# Patient Record
Sex: MALE | Race: OTHER | Employment: FULL TIME | URBAN - NONMETROPOLITAN AREA
[De-identification: names, ages, dates, MRNs, and addresses within clinical notes are randomized per-mention and may not be internally consistent; named-entity substitution may affect disease eponyms.]

---

## 2021-11-18 ENCOUNTER — HOSPITAL ENCOUNTER (EMERGENCY)
Age: 21
Discharge: HOME | End: 2021-11-18
Attending: EMERGENCY MEDICINE
Payer: COMMERCIAL

## 2021-11-18 VITALS
HEART RATE: 88 BPM | HEIGHT: 68 IN | SYSTOLIC BLOOD PRESSURE: 187 MMHG | BODY MASS INDEX: 24.25 KG/M2 | DIASTOLIC BLOOD PRESSURE: 85 MMHG | WEIGHT: 160 LBS | RESPIRATION RATE: 18 BRPM | OXYGEN SATURATION: 100 % | TEMPERATURE: 98.8 F

## 2021-11-18 DIAGNOSIS — S05.51XA INTRAOCULAR FOREIGN BODY OF RIGHT EYE, INITIAL ENCOUNTER: Primary | ICD-10-CM

## 2021-11-18 PROCEDURE — 99284 EMERGENCY DEPT VISIT MOD MDM: CPT

## 2021-11-18 PROCEDURE — 6370000000 HC RX 637 (ALT 250 FOR IP): Performed by: EMERGENCY MEDICINE

## 2021-11-18 PROCEDURE — 65222 REMOVE FOREIGN BODY FROM EYE: CPT

## 2021-11-18 RX ORDER — TETRACAINE HYDROCHLORIDE 5 MG/ML
1 SOLUTION OPHTHALMIC ONCE
Status: COMPLETED | OUTPATIENT
Start: 2021-11-18 | End: 2021-11-18

## 2021-11-18 RX ORDER — ERYTHROMYCIN 5 MG/G
OINTMENT OPHTHALMIC ONCE
Status: COMPLETED | OUTPATIENT
Start: 2021-11-18 | End: 2021-11-18

## 2021-11-18 RX ADMIN — ERYTHROMYCIN: 5 OINTMENT OPHTHALMIC at 20:32

## 2021-11-18 RX ADMIN — TETRACAINE HYDROCHLORIDE 1 DROP: 5 SOLUTION OPHTHALMIC at 20:32

## 2021-11-18 RX ADMIN — FLUORESCEIN SODIUM 1 EACH: 0.6 STRIP OPHTHALMIC at 20:32

## 2021-11-18 ASSESSMENT — PAIN DESCRIPTION - LOCATION: LOCATION: EYE

## 2021-11-18 ASSESSMENT — ENCOUNTER SYMPTOMS
PHOTOPHOBIA: 0
EYE REDNESS: 1
EYE PAIN: 1
EYE DISCHARGE: 1

## 2021-11-18 ASSESSMENT — PAIN DESCRIPTION - ORIENTATION: ORIENTATION: RIGHT

## 2021-11-18 ASSESSMENT — PAIN DESCRIPTION - ONSET: ONSET: ON-GOING

## 2021-11-18 ASSESSMENT — PAIN DESCRIPTION - FREQUENCY: FREQUENCY: CONTINUOUS

## 2021-11-18 ASSESSMENT — PAIN SCALES - GENERAL: PAINLEVEL_OUTOF10: 6

## 2021-11-18 ASSESSMENT — PAIN DESCRIPTION - PAIN TYPE: TYPE: ACUTE PAIN

## 2021-11-18 ASSESSMENT — PAIN DESCRIPTION - DESCRIPTORS: DESCRIPTORS: SHARP

## 2021-11-19 NOTE — ED NOTES
Discharge instructions and medications reviewed with patient. Patient verbalized understanding of medications and follow up. All questions answered at this time. Skin warm, pink, and dry. Patient alert and oriented x4. Pt ambulated to lobby with a stable gait. Patient discharged home with 0 prescriptions.       Noelle Romberg, RN  11/18/21 2032

## 2021-11-19 NOTE — ED PROVIDER NOTES
CHIEF COMPLAINT  Foreign Body in Eye (right eye, pt reports drilling into metal and piece of metal went into right eye, \"couple hours ago\")      HISTORY OF PRESENT ILLNESS  Quin Chin is a 23 y.o. male with out significant history presents emergency department for evaluation of right eye foreign body. Patient speaks Pakistani and encounter is performed with the use of a Pakistani . Patient is accompanied by his boss from his worksite. According to the patient, several hours ago he was drilling into metal when a piece of metal went into his right eye. He states that the pain has gone away. He states that he was able to see this small piece of metal in his eye. He denies any visual changes at this time. No other complaints, modifying factors or associated symptoms. No past medical history on file. No past surgical history on file. No family history on file. Social History     Socioeconomic History    Marital status: Single     Spouse name: Not on file    Number of children: Not on file    Years of education: Not on file    Highest education level: Not on file   Occupational History    Not on file   Tobacco Use    Smoking status: Not on file    Smokeless tobacco: Not on file   Substance and Sexual Activity    Alcohol use: Not on file    Drug use: Not Currently    Sexual activity: Not on file   Other Topics Concern    Not on file   Social History Narrative    Not on file     Social Determinants of Health     Financial Resource Strain:     Difficulty of Paying Living Expenses: Not on file   Food Insecurity:     Worried About Running Out of Food in the Last Year: Not on file    Jadyn of Food in the Last Year: Not on file   Transportation Needs:     Lack of Transportation (Medical): Not on file    Lack of Transportation (Non-Medical):  Not on file   Physical Activity:     Days of Exercise per Week: Not on file    Minutes of Exercise per Session: Not on file   Stress:     Feeling of Stress : Not on file   Social Connections:     Frequency of Communication with Friends and Family: Not on file    Frequency of Social Gatherings with Friends and Family: Not on file    Attends Jehovah's witness Services: Not on file    Active Member of Clubs or Organizations: Not on file    Attends Club or Organization Meetings: Not on file    Marital Status: Not on file   Intimate Partner Violence:     Fear of Current or Ex-Partner: Not on file    Emotionally Abused: Not on file    Physically Abused: Not on file    Sexually Abused: Not on file   Housing Stability:     Unable to Pay for Housing in the Last Year: Not on file    Number of [place] in the Last Year: Not on file    Unstable Housing in the Last Year: Not on file     No current facility-administered medications for this encounter. No current outpatient medications on file. No Known Allergies    REVIEW OF SYSTEMS  Review of Systems   Eyes: Positive for pain, discharge and redness. Negative for photophobia and visual disturbance. Skin: Negative for rash. PHYSICAL EXAM  BP (!) 187/85   Pulse 88   Temp 98.8 °F (37.1 °C) (Oral)   Resp 18   Ht 5' 8\" (1.727 m)   Wt 160 lb (72.6 kg)   SpO2 100%   BMI 24.33 kg/m²   GENERAL APPEARANCE: Awake and alert. Cooperative. HEAD: Normocephalic. Atraumatic. EYES: PERRL. EOM's grossly intact. There is a punctate metallic foreign body at the 11 oclock position in the conjunctiva above the cornea. On fluorescein exam, there is no uptake consistent with corneal ulceration or abrasion  ENT: Mucous membranes are moist.   ABDOMEN: Soft. Non-distended. Non-tender. No guarding or rebound. EXTREMITIES: No peripheral edema. No acute deformities. SKIN: Warm and dry. No acute rashes. NEUROLOGICAL: Alert and oriented X 3. No focal neurological deficits  PSYCHIATRIC: Normal mood and affect. LABS  I have reviewed all labs for this visit.    No results found for this visit on 11/18/21. ED COURSE/MDM    Patient presenting with metallic foreign body in the conjunctiva of the right eye. There is no visual changes or evidence of corneal abrasion, ulceration. With slit-lamp exam, and odilon vise, I attempted to remove the metallic foreign body. Despite topical anesthetic, patient could not tolerate the procedure. He states that he does not have any pain associated with the procedure but cannot sit still for the procedure as he is nervous regarding the sensation/procedure. As the patient is not able to sit still for the procedure, I am concerned that this could lead to undue injury. As the metallic foreign body is not in a central line of vision or the cornea, outpatient ophthalmology follow-up is appropriate. He was provided with to local ophthalmology offices to follow-up with. He will be provided with erythromycin ointment to use as directed until his follow-up. He expresses understanding with this. The patient will be discharged from the emergency department. The patient was counseled on their diagnosis and any medications prescribed. They were advised on the need for PCP followup. They were counseled on the need to return to the emergency department if any of their symptoms were to worsen, change or have any other concerns. Discharged in stable condition. CLINICAL IMPRESSION  1. Intraocular foreign body of right eye, initial encounter        Blood pressure (!) 187/85, pulse 88, temperature 98.8 °F (37.1 °C), temperature source Oral, resp. rate 18, height 5' 8\" (1.727 m), weight 160 lb (72.6 kg), SpO2 100 %. DISPOSITION  Jonatan Al was discharged to homein stable condition. This chart was generated in part by using Dragon Dictation system and may contain errors related to that system including errors in grammar, punctuation, and spelling, as well as words and phrases that may be inappropriate.  If there are any questions or concerns please feel free to contact the dictating provider for clarification.      Josias Pepe MD  11/18/21 [id number]

## 2021-11-20 ENCOUNTER — HOSPITAL ENCOUNTER (EMERGENCY)
Age: 21
Discharge: HOME | End: 2021-11-20
Attending: STUDENT IN AN ORGANIZED HEALTH CARE EDUCATION/TRAINING PROGRAM
Payer: COMMERCIAL

## 2021-11-20 VITALS
RESPIRATION RATE: 18 BRPM | BODY MASS INDEX: 24.71 KG/M2 | OXYGEN SATURATION: 98 % | WEIGHT: 163 LBS | HEART RATE: 74 BPM | DIASTOLIC BLOOD PRESSURE: 78 MMHG | SYSTOLIC BLOOD PRESSURE: 118 MMHG | TEMPERATURE: 98.3 F | HEIGHT: 68 IN

## 2021-11-20 DIAGNOSIS — T15.91XD FOREIGN BODY OF RIGHT EYE, SUBSEQUENT ENCOUNTER: Primary | ICD-10-CM

## 2021-11-20 PROCEDURE — 99284 EMERGENCY DEPT VISIT MOD MDM: CPT

## 2021-11-20 ASSESSMENT — PAIN DESCRIPTION - LOCATION: LOCATION: EYE

## 2021-11-20 ASSESSMENT — PAIN SCALES - GENERAL: PAINLEVEL_OUTOF10: 9

## 2021-11-20 ASSESSMENT — PAIN DESCRIPTION - ORIENTATION: ORIENTATION: RIGHT

## 2021-11-20 ASSESSMENT — PAIN DESCRIPTION - DESCRIPTORS: DESCRIPTORS: CONSTANT;SHARP

## 2021-11-20 NOTE — ED PROVIDER NOTES
[address] [facility]  Foreign Body in Eye (C/o pain to his R eye. States FB to R eye. Was seen in this ER 2 days ago for same s/s)     HISTORY OF PRESENT ILLNESS  Rikki Cabral is a 23 y.o. male  who presents to the ED complaining of foreign body in the right eye. Patient states that 2 days ago, he was at work and had a piece of metal fell into his right eye. Per chart review, he was initially seen and there is attempted removal of the foreign body but patient was unable to tolerate it and was given outpatient follow-up. Patient presents today with ongoing eye pain, denies vision changes. He is requesting that we attempted to remove the foreign body. He has not followed up with ophthalmology. He denies any new injuries. He denies any other complaints or concerns. He does not wear contact lenses, is already on antibiotic ointment    No other complaints, modifying factors or associated symptoms. I have reviewed the following from the nursing documentation. History reviewed. No pertinent past medical history. History reviewed. No pertinent surgical history. History reviewed. No pertinent family history.   Social History     Socioeconomic History    Marital status: Single     Spouse name: Not on file    Number of children: Not on file    Years of education: Not on file    Highest education level: Not on file   Occupational History    Not on file   Tobacco Use    Smoking status: Never Smoker    Smokeless tobacco: Never Used   Substance and Sexual Activity    Alcohol use: Never    Drug use: Not Currently    Sexual activity: Not on file   Other Topics Concern    Not on file   Social History Narrative    Not on file     Social Determinants of Health     Financial Resource Strain:     Difficulty of Paying Living Expenses: Not on file   Food Insecurity:     Worried About Running Out of Food in the Last Year: Not on file    Jadyn of Food in the Last Year: Not on file Transportation Needs:     Lack of Transportation (Medical): Not on file    Lack of Transportation (Non-Medical): Not on file   Physical Activity:     Days of Exercise per Week: Not on file    Minutes of Exercise per Session: Not on file   Stress:     Feeling of Stress : Not on file   Social Connections:     Frequency of Communication with Friends and Family: Not on file    Frequency of Social Gatherings with Friends and Family: Not on file    Attends Adventism Services: Not on file    Active Member of [address] [place] [address] [employer] or Organizations: Not on file    Attends Club or Organization Meetings: Not on file    Marital Status: Not on file   Intimate Partner Violence:     Fear of Current or Ex-Partner: Not on file    Emotionally Abused: Not on file    Physically Abused: Not on file    Sexually Abused: Not on file   Housing Stability:     Unable to Pay for Housing in the Last Year: Not on file    Number of [place] in the Last Year: Not on file    Unstable Housing in the Last Year: Not on file     No current facility-administered medications for this encounter. No current outpatient medications on file. No Known Allergies    REVIEW OF SYSTEMS  10 systems reviewed, pertinent positives per HPI otherwise noted to be negative. PHYSICAL EXAM  /81   Pulse 71   Temp 98.6 °F (37 °C) (Oral)   Resp 18   Ht 5' 8\" (1.727 m)   Wt 163 lb (73.9 kg)   SpO2 98%   BMI 24.78 kg/m²    GENERAL APPEARANCE: Awake and alert. Cooperative. No acute distress. HENT: Normocephalic. Atraumatic. Mucous membranes are moist.   NECK: Supple. EYES: PERRL. EOM's grossly intact. Small foreign body overlying the sclera at the 10 o'clock position of the right eye, not overlying the cornea. No evidence of penetrating globe injury. HEART/CHEST: RRR. No murmurs. LUNGS: Respirations unlabored. CTAB. Good air exchange. Speaking comfortably in full sentences. ABDOMEN: No tenderness. Soft. Non-distended. No masses.  No organomegaly. No guarding or rebound. MUSCULOSKELETAL: No extremity edema. Compartments soft. No deformity. No tenderness in the extremities. All extremities neurovascularly intact. SKIN: Warm and dry. No acute rashes. NEUROLOGICAL: Alert and oriented. CN's 2-12 intact. No gross facial drooping. Strength 5/5, sensation intact. PSYCHIATRIC: Normal mood and affect. LABS  I have reviewed all labs for this visit. No results found for this visit on 11/20/21. RADIOLOGY  No orders to display     ED COURSE/MDM  Patient seen and evaluated. Old records reviewed. Labs and imaging reviewed and results discussed with patient. Patient is a 53-year-old male, presenting with concerns for foreign body in his right eye on subsequent visit. Full HPI as detailed above. Upon arrival in the ED, vitals reassuring. Patient is resting comfortably. Physical exam as detailed above, he has a retained metallic foreign body in his right eye, did not follow-up with ophthalmology. 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 He is already on antibiotic ointment, was advised that he will need to follow-up with [facility] Jefferson Aly Estrellarubén for further evaluation since her unable to remove the foreign body in the ED. Patient is comfortable in agreement with plan of care will be discharged. Given return precautions for the ED. During the patient's ED course, the patient was given:  Medications - No data to display     CLINICAL IMPRESSION  1. Foreign body of right eye, subsequent encounter        Blood pressure 121/81, pulse 71, temperature 98.6 °F (37 °C), temperature source Oral, resp. rate 18, height 5' 8\" (1.727 m), weight 163 lb (73.9 kg), SpO2 98 %. DISPOSITION  Micaela Hopper was discharged to home in good condition. Patient was given scripts for the following medications. I counseled patient how to take these medications.    New Prescriptions    No medications on file       Follow-up with:  [facility]  [address]  Go to   If symptoms worsen    [address] [address]    Schedule an appointment as soon as possible for a visit         DISCLAIMER: This chart was created using Dragon dictation software. Efforts were made by me to ensure accuracy, however some errors may be present due to limitations of this technology and occasionally words are not transcribed correctly.        Juancarlos Ventura MD  11/20/21 9796